# Patient Record
Sex: FEMALE | Race: WHITE
[De-identification: names, ages, dates, MRNs, and addresses within clinical notes are randomized per-mention and may not be internally consistent; named-entity substitution may affect disease eponyms.]

---

## 2015-09-04 VITALS — SYSTOLIC BLOOD PRESSURE: 129 MMHG | DIASTOLIC BLOOD PRESSURE: 81 MMHG

## 2018-02-13 ENCOUNTER — HOSPITAL ENCOUNTER (OUTPATIENT)
Dept: HOSPITAL 24 - RAD | Age: 53
Discharge: HOME | DRG: 552 | End: 2018-02-13
Attending: NURSE PRACTITIONER
Payer: MEDICAID

## 2018-02-13 DIAGNOSIS — M54.5: Primary | ICD-10-CM

## 2018-02-13 DIAGNOSIS — M47.897: ICD-10-CM

## 2018-02-13 PROCEDURE — 72100 X-RAY EXAM L-S SPINE 2/3 VWS: CPT

## 2018-02-14 NOTE — RAD
HISTORY:  Low back pain.



Study:  AP and lateral lumbar spine



Comparison:  None



Findings:  



Five lumbar type vertebra present.  There is minimal convex right upper lumbar scoliosis.  Mild facet
 arthropathy is present at multiple levels with mild-to-moderate L5/S1.  Minimal degenerative changes
 present in the sacroiliac joints.  The lateral view demonstrates normal curvature and alignment.  Th
e vertebral bodies and intervertebral disc spaces are of normal height.  Very minimal anterior spurri
ng is noted at a couple of levels.



IMPRESSION:

1.  Lumbar spondylosis as described above.

2.  No acute bony abnormalities are identified.







Reported By:Electronically Signed by LOS ANDRADE MD at 2/14/2018 8:18:20 AM